# Patient Record
Sex: FEMALE | Race: WHITE
[De-identification: names, ages, dates, MRNs, and addresses within clinical notes are randomized per-mention and may not be internally consistent; named-entity substitution may affect disease eponyms.]

---

## 2019-06-28 ENCOUNTER — HOSPITAL ENCOUNTER (OUTPATIENT)
Dept: HOSPITAL 61 - KCIC US | Age: 34
Discharge: HOME | End: 2019-06-28
Attending: FAMILY MEDICINE
Payer: COMMERCIAL

## 2019-06-28 DIAGNOSIS — Z34.82: Primary | ICD-10-CM

## 2019-06-28 DIAGNOSIS — Z3A.15: ICD-10-CM

## 2019-06-28 PROCEDURE — 76815 OB US LIMITED FETUS(S): CPT

## 2019-06-28 NOTE — [facility]
Examination: Obstetric ultrasound limited

 

HISTORY: History of no fetal heart tones in office

 

COMPARISON: None available.

 

FINDINGS:

 

Single living intrauterine pregnancy identified with fetal heart rate of 

152 bpm.

 

Amniotic fluid index is 7.7 with largest pocket measuring 2.2 cm. Fetal 

movement is seen. Cardiac activity seen.

 

The biparietal diameter measures 2.9 cm corresponding to 15 weeks and 2 

days. 

 

Head circumference measures 10.6 cm corresponding to 15 weeks and 1 day.

 

Abdominal circumference measures 9.3 cm corresponding to 15 weeks and 4 

days.

 

Femur length measures 1.7 cm corresponding to 15 weeks and 1 day.

 

Ultrasound gestational age 15 weeks and 2 days with expected date of 

delivery 12/18/2019.

 

IMPRESSION:

 

Single living intrauterine pregnancy with fetal heart rate of 152 bpm.

Amniotic fluid index is 7.7.

 

Electronically signed by: Tonny Hallman MD (6/28/2019 5:32 PM) [id number]

## 2019-07-29 ENCOUNTER — HOSPITAL ENCOUNTER (OUTPATIENT)
Dept: HOSPITAL 61 - US | Age: 34
Discharge: HOME | End: 2019-07-29
Attending: FAMILY MEDICINE
Payer: COMMERCIAL

## 2019-07-29 DIAGNOSIS — Z3A.19: ICD-10-CM

## 2019-07-29 DIAGNOSIS — O26.842: Primary | ICD-10-CM

## 2019-07-29 PROCEDURE — 76805 OB US >/= 14 WKS SNGL FETUS: CPT

## 2019-07-29 NOTE — RAD
Exam performed: OB sonogram greater than 14 weeks.

 

History: Unsure of dates

 

Date of Service: 7/29/2019 ,comparison: June 28, 2019.

 

Technique: Transabdominal.

 

Findings:

 

Single intrauterine fetus is seen in cephalic presentation.The fetal 

maturity is as follows.

 

BPD 4.26 cm (18 weeks and 6 days)

Head circumference 16.05 cm (18 weeks and 6 days)

Abdominal circumference 14.36 cm (19 weeks and 5 days)

Femur length 3.05 cm (19 weeks and 3 days)

HC to AC ratio 1.12. 

Fetal heart rate measures 143 beats per minute.

There is adequate amniotic fluid volume  .

Composite maturity is 19 weeks and 2 days  , corresponding to the YUNG off 

12/21/2019.

Maturity by LMP is 19 weeks and 5 days with a YUNG of 12/18/2019.

Cervix measures 4.0 cm. There is tiny amount of fluid in the upper 

cervical canal.

Estimated fetal weight is 295 grams.

Fetal anatomy including 4 chamber heart, abdominal wall cord insertion 

fluid-filled stomach and urinary bladder, bilateral kidneys, spine and 

brain appears normal.

Placenta is posterior wall  and low lying

 

Impression:

 

Single live intrauterine fetus in cephalic presentation of maturity 19 

weeks and 2 days. Tiny amount of fluid in the endocervical canal raises 

question for mild incompetence. Short-term interval follow-up exam may be 

obtained to ensure interval stability

 

Electronically signed by: Leny Thayer MD (7/29/2019 12:08 PM) [facility]

## 2019-08-13 ENCOUNTER — HOSPITAL ENCOUNTER (OUTPATIENT)
Dept: HOSPITAL 61 - US | Age: 34
Discharge: HOME | End: 2019-08-13
Attending: FAMILY MEDICINE
Payer: COMMERCIAL

## 2019-08-13 PROCEDURE — 76815 OB US LIMITED FETUS(S): CPT

## 2019-08-14 NOTE — RAD
Limited OB ultrasound dated 8/13/2019.

 

No comparison available.

 

Clinical data indication: Check cervix.

 

FINDINGS:

 

Limited transabdominal and transvaginal imaging was performed of the 

cervix. The cervical length is estimated at 5.4 cm on the transvaginal 

scan. Fetus is in breech presentation. The placenta is posterior in 

location with no evidence of placental previa. Fetal heart rate 130 bpm. 

The anatomic fluid volume appears appropriate.

 

IMPRESSION:

 

Cervix is closed and estimated at about 5.4 cm in length.

 

Electronically signed by: Gutierrez Hatfield MD (8/14/2019 2:03 AM) 

[id number]